# Patient Record
Sex: FEMALE | Race: BLACK OR AFRICAN AMERICAN
[De-identification: names, ages, dates, MRNs, and addresses within clinical notes are randomized per-mention and may not be internally consistent; named-entity substitution may affect disease eponyms.]

---

## 2019-07-25 ENCOUNTER — HOSPITAL ENCOUNTER (EMERGENCY)
Dept: HOSPITAL 72 - EMR | Age: 29
Discharge: HOME | End: 2019-07-25
Payer: COMMERCIAL

## 2019-07-25 VITALS — WEIGHT: 180 LBS | BODY MASS INDEX: 35.34 KG/M2 | HEIGHT: 60 IN

## 2019-07-25 VITALS — DIASTOLIC BLOOD PRESSURE: 87 MMHG | SYSTOLIC BLOOD PRESSURE: 122 MMHG

## 2019-07-25 VITALS — SYSTOLIC BLOOD PRESSURE: 122 MMHG | DIASTOLIC BLOOD PRESSURE: 87 MMHG

## 2019-07-25 DIAGNOSIS — S60.032A: Primary | ICD-10-CM
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
## 2019-07-25 PROCEDURE — 99282 EMERGENCY DEPT VISIT SF MDM: CPT

## 2019-07-25 NOTE — EMERGENCY ROOM REPORT
History of Present Illness


General


Chief Complaint:  Pain


Source:  Patient





Present Illness


HPI


28-year-old female presents with left  middle finger pain, patient reports that 

the window caught her nail, she endorses a sharp pain worse with movement, 

relieved with rest, no other complaints,


Allergies:  


Coded Allergies:  


     METOCLOPRAMIDE (Verified  Allergy, Unknown, 7/25/19)





Patient History


Past Medical History:  see triage record


Reviewed Nursing Documentation:  PMH: Agreed; PSxH: Agreed





Nursing Documentation-PMH


Past Medical History:  No Stated History





Physical Exam





Vital Signs








  Date Time  Temp Pulse Resp B/P (MAP) Pulse Ox O2 Delivery O2 Flow Rate FiO2


 


7/25/19 00:10 98.1 76 18 130/89 (103) 98 Room Air  








Sp02 EP Interpretation:  reviewed, normal


General Appearance:  well appearing, no apparent distress, alert


Head:  normocephalic, atraumatic


Eyes:  bilateral eye PERRL, bilateral eye EOMI


ENT:  uvula midline, moist mucus membranes


Neck:  supple, thyroid normal, supple/symm/no masses


Respiratory:  lungs clear, no respiratory distress, no retraction, no accessory 

muscle use


Cardiovascular #1:  normal peripheral pulses, regular rate, rhythm, no edema, 

no gallop, no murmur


Gastrointestinal:  non tender, soft, no guarding, no rebound


Musculoskeletal:  other - Left middle finger nail, partially avulsed, 

neurovascular exam unremarkable, tenderness to palpation of the left finger


Neurologic:  alert, oriented x3


Psychiatric:  mood/affect normal


Skin:  no rash, warm/dry





Medical Decision Making


Diagnostic Impression:  


 Primary Impression:  


 Contusion of left middle finger with damage to nail, initial encounter


ER Course


Patient with a partially avulsed left fingernail, decision not to remove the 

nail, patient states that she would rather follow-up with Ortho hand, patient 

given pain control, return precautions discussed, follow-up with PCP





Last Vital Signs








  Date Time  Temp Pulse Resp B/P (MAP) Pulse Ox O2 Delivery O2 Flow Rate FiO2


 


7/25/19 00:10 98.1 76 18 130/89 (103) 98 Room Air  








Disposition:  HOME, SELF-CARE


Condition:  Stable


Scripts


Naproxen* (NAPROSYN*) 250 Mg Tablet


500 MG ORAL BID PRN for For Pain, #20 TAB 0 Refills


   Prov: Cesar Crowley MD         7/25/19


Referrals:  


[facility]


Patient Instructions:  Nail Avulsion, Nail Bed Injury, Easy-to-Read





Additional Instructions:  


The patient was provided with discharge instructions, notified to follow-up 

with a primary care doctor and or specialist in the next 24-48 hours, and to 

return to the ED if they have worsening of their symptoms. 





Please note that this report is being documented using DRAGON technology.


This can lead to erroneous entry secondary to incorrect interpretation by the 

dictating instrument.











Cesar Crowley MD Jul 25, 2019 00:56

## 2019-07-25 NOTE — NUR
ED Nurse Note:



Recieved pt from home, in roomc rashel, pt here with c/o left middle finger 
smashed accidentially in window, pt has artificial acrylic nails on also, pt 
entire nail bed is lifted, mild bleeding and swelling noted, pt reports severe 
10/10 pain, denies any other injuries or complaints.